# Patient Record
Sex: MALE | Race: OTHER | Employment: OTHER | URBAN - METROPOLITAN AREA
[De-identification: names, ages, dates, MRNs, and addresses within clinical notes are randomized per-mention and may not be internally consistent; named-entity substitution may affect disease eponyms.]

---

## 2017-01-05 NOTE — PATIENT DISCUSSION
(L82.1) Other seborrheic keratosis - Assesment : Pigmented Seborrheic Keratosis right medial canthus nasal area, growing per patient - Plan : MINOR SX- EXCISION

## 2017-01-05 NOTE — PATIENT DISCUSSION
(W41.984) Keratoconjunct sicca, not specified as Sjogren's, bilateral - Assesment : Examination revealed Dry Eye Syndrome, likely causing episodes of blurred vision that comes and goes - Plan : Monitor for changes. Advised patient to call our office with decreased vision or increased symptoms.  Continue AT'S PRN

## 2017-01-05 NOTE — PATIENT DISCUSSION
(H20.711) Other secondary cataract, right eye - Assesment : Moderate posterior capsule opacification present OD. - Plan : Monitor for Changes. Advised patient to call our office with decreased vision or increased symptoms. Discussed YAG, patient wishes to wait at this time.  RV 1 year or sooner with vision problems

## 2017-01-05 NOTE — PATIENT DISCUSSION
(D23.11) Oth benign neoplasm skin/ right eyelid, including canthus - Assesment : Examination revealed benign neoplasm of the lids VS. PATRICIA K  Pigmented AREA right medial canthus nasal area, growing per patient - Plan : MINOR SX - BIOPSY RIGHT MEDIAL CANTHUS-NASAL AREA

## 2017-01-05 NOTE — PATIENT DISCUSSION
(H40.011) Open angle with borderline findings, low risk, right eye - Assesment : Examination revealed suspicion for Open Angle Glaucoma. No specific glaucoma defect OU. IOP is stable OU - Plan : Monitor for changes. Advised patient to call our office when decreased vision or increased eye pain.

## 2017-01-05 NOTE — PATIENT DISCUSSION
(B71.9628) Nexdtve age-related mclr degn bilateral early dry stage - Assesment : Examination revealed AMD Dry. - Plan : Monitor for changes. Advised patient to call our office with decreased vision or increased distortion.  Amsler grid given to patient

## 2017-05-04 NOTE — PATIENT DISCUSSION
(L82.1) Other seborrheic keratosis - Assesment : Pigmented Seborrheic Keratosis right medial canthus nasal area, growing per patient - Plan : MINOR SX- EXCISION OF LID LESION TODAY  BACITRACIN ROMÁN APPLIED AFTER MINOR  USE OTC NEOSPORIN ROMÁN 2-3 X DAY X 1 WEEK THEN STOP

## 2017-05-04 NOTE — PATIENT DISCUSSION
(D23.11) Oth benign neoplasm skin/ right eyelid, including canthus - Assesment : Examination revealed benign neoplasm of the lids VS. PATRICIA K  Pigmented AREA right medial canthus nasal area, growing per patient - Plan : MINOR SX - BIOPSY RIGHT MEDIAL CANTHUS BACITRACIN APPLIED AFTER MINOR  UST OTC NEOSPORIN 2-3 X DAY X 1 WEEK THEN STOP  1 WEEK F/U -MAY CANCEL IF NEGATIVE BX.

## 2018-03-26 NOTE — PATIENT DISCUSSION
(H26.001) Other secondary cataract, right eye - Assesment : Significant posterior capsule opacification present. - Plan : Monitor for Changes. Advised patient to call our office with decreased vision or increased symptoms.

## 2018-03-26 NOTE — PATIENT DISCUSSION
(O47.5259) Nonexudative age-related macular degeneration bilateral candis - Assesment : Examination revealed mild-intermediate AMD Dry. - Plan : Patient advised to check Amsler Grid regularly (once weekly or more) and use nutraceuticals such as AREDS 2 eye vitamins. Wear sunglasses when outdoors and eat green, leafy vegetables to maintain ocular health.  6 months dilation-octm

## 2018-03-26 NOTE — PATIENT DISCUSSION
(N22.469) Keratoconjunct sicca, not specified as Sjogren's, bilateral - Assesment : Examination revealed Dry Eye Syndrome, likely causing episodes of blurred vision that comes and goes while playing tennis - Plan : Monitor for changes. Advised patient to call our office with decreased vision or increased symptoms.   Warm soaks QHS OU Recommend artificial tears(Systane balance)

## 2018-10-26 NOTE — PATIENT DISCUSSION
(J60.6834) Nonexudative age-related macular degeneration bilateral candis - Assesment : Examination revealed mild-intermediate AMD Dry, STABLE - Plan : Patient advised to check Amsler Grid regularly (once weekly or more) and use nutraceuticals such as AREDS 2 eye vitamins. Wear sunglasses when outdoors and eat green, leafy vegetables to maintain ocular health.  RV 6 months EXAM/ONH OCT

## 2018-10-26 NOTE — PATIENT DISCUSSION
(H40.011) Open angle with borderline findings, low risk, right eye - Assesment : Examination revealed suspicion for Open Angle Glaucoma. No specific glaucoma defect OU. IOP is stable OU - Plan : Monitor for changes. Advised patient to call our office when decreased vision or increased eye pain.   RV 6 MONTHS EXAM/ONH OCT

## 2019-05-28 NOTE — PATIENT DISCUSSION
(H40.013) Open angle with borderline findings, low risk, bilateral - Assesment : Examination revealed suspicion for Open Angle Glaucoma. No specific glaucoma defect OU. IOP is stable OU - Plan : Monitor for changes. Advised patient to call our office when decreased vision or increased eye pain.     6 MONTH DILATION/MAC PHOTOS

## 2019-05-28 NOTE — PATIENT DISCUSSION
(Q68.1804) Nonexudative age-related macular degeneration bilateral candis - Assesment : Examination revealed mild-intermediate AMD Dry, STABLE - Plan : Patient advised to check Amsler Grid regularly (once weekly or more) and use nutraceuticals such as AREDS 2 eye vitamins. Wear sunglasses when outdoors and eat green, leafy vegetables to maintain ocular health.

## 2020-11-30 ENCOUNTER — PREPPED CHART (OUTPATIENT)
Dept: URBAN - METROPOLITAN AREA CLINIC 25 | Facility: CLINIC | Age: 55
End: 2020-11-30

## 2021-11-24 ASSESSMENT — TONOMETRY
OD_IOP_MMHG: 15
OS_IOP_MMHG: 17

## 2021-11-29 ENCOUNTER — ESTABLISHED COMPREHENSIVE EXAM (OUTPATIENT)
Dept: URBAN - METROPOLITAN AREA CLINIC 25 | Facility: CLINIC | Age: 56
End: 2021-11-29

## 2021-11-29 DIAGNOSIS — H52.4: ICD-10-CM

## 2021-11-29 DIAGNOSIS — H52.03: ICD-10-CM

## 2021-11-29 PROCEDURE — 92014 COMPRE OPH EXAM EST PT 1/>: CPT

## 2021-11-29 PROCEDURE — 92015 DETERMINE REFRACTIVE STATE: CPT

## 2021-11-29 ASSESSMENT — KERATOMETRY
OS_AXISANGLE_DEGREES: 97
OS_K2POWER_DIOPTERS: 43.25
OD_K1POWER_DIOPTERS: 41.75
OD_AXISANGLE_DEGREES: 124
OD_K2POWER_DIOPTERS: 43.00
OS_K1POWER_DIOPTERS: 42.25
OS_AXISANGLE2_DEGREES: 7
OD_AXISANGLE2_DEGREES: 34

## 2021-11-29 ASSESSMENT — TONOMETRY
OS_IOP_MMHG: 15
OD_IOP_MMHG: 15

## 2021-11-29 ASSESSMENT — VISUAL ACUITY
OD_CC: 20/25
OS_CC: 20/20

## 2022-06-08 ENCOUNTER — OFFICE VISIT (OUTPATIENT)
Dept: URBAN - METROPOLITAN AREA CLINIC 63 | Facility: CLINIC | Age: 57
End: 2022-06-08

## 2022-06-08 ENCOUNTER — OFFICE VISIT (OUTPATIENT)
Dept: URBAN - METROPOLITAN AREA TELEHEALTH 2 | Facility: TELEHEALTH | Age: 57
End: 2022-06-08

## 2022-07-09 ENCOUNTER — TELEPHONE ENCOUNTER (OUTPATIENT)
Dept: URBAN - METROPOLITAN AREA CLINIC 121 | Facility: CLINIC | Age: 57
End: 2022-07-09

## 2022-07-09 RX ORDER — LEVOTHYROXINE SODIUM 25 UG/1
TABLET ORAL
Refills: 0 | OUTPATIENT
Start: 2014-05-02 | End: 2022-06-08

## 2022-07-09 RX ORDER — LEVOTHYROXINE SODIUM 25 UG/1
TABLET ORAL
Refills: 0 | OUTPATIENT
Start: 2014-03-24 | End: 2014-05-02

## 2022-07-10 ENCOUNTER — TELEPHONE ENCOUNTER (OUTPATIENT)
Dept: URBAN - METROPOLITAN AREA CLINIC 121 | Facility: CLINIC | Age: 57
End: 2022-07-10

## 2022-07-10 RX ORDER — LEVOTHYROXINE SODIUM 25 UG/1
TABLET ORAL ONCE A DAY
Refills: 0 | Status: ACTIVE | COMMUNITY
Start: 2022-03-10

## 2022-11-29 ENCOUNTER — ESTABLISHED PATIENT (OUTPATIENT)
Dept: URBAN - METROPOLITAN AREA CLINIC 25 | Facility: CLINIC | Age: 57
End: 2022-11-29

## 2022-11-29 DIAGNOSIS — H52.4: ICD-10-CM

## 2022-11-29 DIAGNOSIS — H52.03: ICD-10-CM

## 2022-11-29 PROCEDURE — 92015 DETERMINE REFRACTIVE STATE: CPT

## 2022-11-29 PROCEDURE — 92014 COMPRE OPH EXAM EST PT 1/>: CPT

## 2022-11-29 ASSESSMENT — VISUAL ACUITY
OD_CC: 20/25
OS_CC: 20/25

## 2022-11-29 ASSESSMENT — KERATOMETRY
OS_AXISANGLE2_DEGREES: 5
OS_K2POWER_DIOPTERS: 43.00
OD_K2POWER_DIOPTERS: 43.00
OS_AXISANGLE2_DEGREES: 7
OS_K2POWER_DIOPTERS: 43.25
OD_AXISANGLE_DEGREES: 121
OD_AXISANGLE2_DEGREES: 34
OD_AXISANGLE_DEGREES: 124
OD_K1POWER_DIOPTERS: 41.75
OD_AXISANGLE2_DEGREES: 31
OS_K1POWER_DIOPTERS: 42.25
OS_AXISANGLE_DEGREES: 95
OD_K2POWER_DIOPTERS: 42.75
OS_AXISANGLE_DEGREES: 97

## 2022-11-29 ASSESSMENT — TONOMETRY
OS_IOP_MMHG: 14
OD_IOP_MMHG: 10

## 2024-01-25 ENCOUNTER — ESTABLISHED PATIENT (OUTPATIENT)
Dept: URBAN - METROPOLITAN AREA CLINIC 25 | Facility: CLINIC | Age: 59
End: 2024-01-25

## 2024-01-25 DIAGNOSIS — H52.4: ICD-10-CM

## 2024-01-25 DIAGNOSIS — H52.03: ICD-10-CM

## 2024-01-25 PROCEDURE — 92015 DETERMINE REFRACTIVE STATE: CPT

## 2024-01-25 PROCEDURE — 92014 COMPRE OPH EXAM EST PT 1/>: CPT

## 2024-01-25 PROCEDURE — 92499RS OCT RETINAL SCREENING, ELECTIVE

## 2024-01-25 ASSESSMENT — KERATOMETRY
OS_AXISANGLE2_DEGREES: 178
OS_AXISANGLE_DEGREES: 88
OS_K2POWER_DIOPTERS: 43.00
OS_AXISANGLE2_DEGREES: 5
OS_K1POWER_DIOPTERS: 42.25
OS_AXISANGLE2_DEGREES: 7
OD_AXISANGLE_DEGREES: 121
OD_K2POWER_DIOPTERS: 42.75
OD_AXISANGLE_DEGREES: 124
OD_K1POWER_DIOPTERS: 41.75
OS_K2POWER_DIOPTERS: 43.25
OD_AXISANGLE2_DEGREES: 34
OD_AXISANGLE2_DEGREES: 31
OD_K2POWER_DIOPTERS: 43.00
OD_K1POWER_DIOPTERS: 41.50
OS_AXISANGLE_DEGREES: 95
OS_AXISANGLE_DEGREES: 97

## 2024-01-25 ASSESSMENT — TONOMETRY
OD_IOP_MMHG: 14
OS_IOP_MMHG: 15

## 2024-01-25 ASSESSMENT — VISUAL ACUITY
OS_CC: 20/20
OD_CC: 20/30-2

## 2024-09-04 ENCOUNTER — LAB OUTSIDE AN ENCOUNTER (OUTPATIENT)
Dept: URBAN - METROPOLITAN AREA SURGERY CENTER 4 | Facility: SURGERY CENTER | Age: 59
End: 2024-09-04

## 2024-09-05 ENCOUNTER — CLAIMS CREATED FROM THE CLAIM WINDOW (OUTPATIENT)
Dept: URBAN - METROPOLITAN AREA SURGERY CENTER 4 | Facility: SURGERY CENTER | Age: 59
End: 2024-09-05
Payer: COMMERCIAL

## 2024-09-05 DIAGNOSIS — Z12.11 ENCOUNTER FOR SCREENING FOR MALIGNANT NEOPLASM OF COLON: ICD-10-CM

## 2024-09-05 DIAGNOSIS — K57.30 DIVERTICULOSIS OF LARGE INTESTINE WITHOUT PERFORATION OR ABSCESS WITHOUT BLEEDING: ICD-10-CM

## 2024-09-05 DIAGNOSIS — K64.1 SECOND DEGREE HEMORRHOIDS: ICD-10-CM

## 2024-09-05 DIAGNOSIS — Z12.11 COLON CANCER SCREENING: ICD-10-CM

## 2024-09-05 PROCEDURE — G0121 COLON CA SCRN NOT HI RSK IND: HCPCS | Performed by: INTERNAL MEDICINE

## 2024-09-05 PROCEDURE — 00812 ANES LWR INTST SCR COLSC: CPT | Performed by: NURSE ANESTHETIST, CERTIFIED REGISTERED

## 2024-09-05 RX ORDER — LEVOTHYROXINE SODIUM 25 UG/1
TABLET ORAL ONCE A DAY
Refills: 0 | Status: ACTIVE | COMMUNITY
Start: 2022-03-10